# Patient Record
Sex: FEMALE | Race: WHITE | NOT HISPANIC OR LATINO | Employment: FULL TIME | URBAN - METROPOLITAN AREA
[De-identification: names, ages, dates, MRNs, and addresses within clinical notes are randomized per-mention and may not be internally consistent; named-entity substitution may affect disease eponyms.]

---

## 2017-04-24 ENCOUNTER — TRANSCRIBE ORDERS (OUTPATIENT)
Dept: ADMINISTRATIVE | Facility: HOSPITAL | Age: 57
End: 2017-04-24

## 2017-04-24 ENCOUNTER — GENERIC CONVERSION - ENCOUNTER (OUTPATIENT)
Dept: OTHER | Facility: OTHER | Age: 57
End: 2017-04-24

## 2017-04-24 ENCOUNTER — TRANSCRIBE ORDERS (OUTPATIENT)
Dept: SLEEP CENTER | Facility: CLINIC | Age: 57
End: 2017-04-24

## 2017-04-24 DIAGNOSIS — Z12.31 VISIT FOR SCREENING MAMMOGRAM: Primary | ICD-10-CM

## 2017-05-11 ENCOUNTER — LAB REQUISITION (OUTPATIENT)
Dept: LAB | Facility: HOSPITAL | Age: 57
End: 2017-05-11
Payer: COMMERCIAL

## 2017-05-11 ENCOUNTER — ALLSCRIPTS OFFICE VISIT (OUTPATIENT)
Dept: OTHER | Facility: OTHER | Age: 57
End: 2017-05-11

## 2017-05-11 DIAGNOSIS — Z01.419 ENCOUNTER FOR GYNECOLOGICAL EXAMINATION WITHOUT ABNORMAL FINDING: ICD-10-CM

## 2017-05-11 DIAGNOSIS — Z12.4 ENCOUNTER FOR SCREENING FOR MALIGNANT NEOPLASM OF CERVIX: ICD-10-CM

## 2017-05-11 DIAGNOSIS — Z12.31 ENCOUNTER FOR SCREENING MAMMOGRAM FOR MALIGNANT NEOPLASM OF BREAST: ICD-10-CM

## 2017-05-11 PROCEDURE — G0145 SCR C/V CYTO,THINLAYER,RESCR: HCPCS | Performed by: OBSTETRICS & GYNECOLOGY

## 2017-05-11 PROCEDURE — 87624 HPV HI-RISK TYP POOLED RSLT: CPT | Performed by: OBSTETRICS & GYNECOLOGY

## 2017-05-16 LAB — HPV RRNA GENITAL QL NAA+PROBE: NORMAL

## 2017-05-18 LAB
LAB AP GYN PRIMARY INTERPRETATION: NORMAL
Lab: NORMAL

## 2018-01-11 NOTE — MISCELLANEOUS
Message   Date: 24 Apr 2017 11:26 AM EST, Recorded By: Ed Meneses For: Wm N[place]: Meet Cavazos, Jonh   Phone: [phone] (Home), [phone] (Work)   Reason: Other   Patient called requesting mammo order  States has no breast problems  Has yearly scheduled 5/11/17  Has not been seen in office for 4 years  Advised wait until appointment and get order then  Active Problems    1  Encounter for screening mammogram for malignant neoplasm of breast (V76 12)   (Z12 31)   2  Other specified menopausal and perimenopausal disorders (627 8) (N95 8)    Current Meds   1  ALPRAZolam 0 25 MG Oral Tablet; Therapy: [id number] to (Last Rx:[id number])  Requested for: [id number] Ordered   2  Atenolol 50 MG Oral Tablet; Therapy: [id number] to (Last Rx:[id number])  Requested for: [id number] Ordered   3  [facility] Vitamin C 500 MG Oral Tablet; Therapy: (Lila Aaron) to Recorded   4  Fish Oil 1000 MG Oral Capsule; Therapy: (Lila Aaron) to Recorded   5  Vitamin B12 TABS; Therapy: (Lila Aaron) to Recorded    Allergies    1   No Known Drug Allergies    Signatures   Electronically signed by : Nereyda North, ; Apr 24 2017 11:30AM EST                       (Author)

## 2018-01-14 VITALS
BODY MASS INDEX: 42.59 KG/M2 | WEIGHT: 249.5 LBS | SYSTOLIC BLOOD PRESSURE: 124 MMHG | HEIGHT: 64 IN | DIASTOLIC BLOOD PRESSURE: 76 MMHG